# Patient Record
Sex: FEMALE | Race: BLACK OR AFRICAN AMERICAN | NOT HISPANIC OR LATINO | URBAN - METROPOLITAN AREA
[De-identification: names, ages, dates, MRNs, and addresses within clinical notes are randomized per-mention and may not be internally consistent; named-entity substitution may affect disease eponyms.]

---

## 2019-02-17 ENCOUNTER — EMERGENCY (EMERGENCY)
Facility: HOSPITAL | Age: 54
LOS: 0 days | Discharge: HOME | End: 2019-02-17
Attending: STUDENT IN AN ORGANIZED HEALTH CARE EDUCATION/TRAINING PROGRAM
Payer: MEDICAID

## 2019-02-17 VITALS
WEIGHT: 162.92 LBS | RESPIRATION RATE: 19 BRPM | OXYGEN SATURATION: 99 % | TEMPERATURE: 98 F | HEART RATE: 83 BPM | DIASTOLIC BLOOD PRESSURE: 90 MMHG | SYSTOLIC BLOOD PRESSURE: 149 MMHG | HEIGHT: 63 IN

## 2019-02-17 VITALS
SYSTOLIC BLOOD PRESSURE: 133 MMHG | TEMPERATURE: 98 F | DIASTOLIC BLOOD PRESSURE: 78 MMHG | RESPIRATION RATE: 18 BRPM | OXYGEN SATURATION: 100 % | HEART RATE: 78 BPM

## 2019-02-17 DIAGNOSIS — J45.909 UNSPECIFIED ASTHMA, UNCOMPLICATED: ICD-10-CM

## 2019-02-17 DIAGNOSIS — L29.9 PRURITUS, UNSPECIFIED: ICD-10-CM

## 2019-02-17 DIAGNOSIS — I10 ESSENTIAL (PRIMARY) HYPERTENSION: ICD-10-CM

## 2019-02-17 PROCEDURE — 99283 EMERGENCY DEPT VISIT LOW MDM: CPT

## 2019-02-17 RX ORDER — HYDROXYZINE HCL 10 MG
1 TABLET ORAL
Qty: 20 | Refills: 0
Start: 2019-02-17 | End: 2019-02-26

## 2019-02-17 RX ORDER — HYDROCORTISONE 1 %
1 OINTMENT (GRAM) TOPICAL
Qty: 1 | Refills: 0
Start: 2019-02-17 | End: 2019-02-23

## 2019-02-17 NOTE — ED ADULT NURSE NOTE - NSIMPLEMENTINTERV_GEN_ALL_ED
Implemented All Universal Safety Interventions:  [place] to call system. Call bell, personal items and telephone within reach. Instruct patient to call for assistance. Room bathroom lighting operational. Non-slip footwear when patient is off stretcher. Physically safe environment: no spills, clutter or unnecessary equipment. Stretcher in lowest position, wheels locked, appropriate side rails in place.

## 2019-02-17 NOTE — ED ADULT NURSE NOTE - OBJECTIVE STATEMENT
Pt A + O x 4, c/o bilat itching on palms and feet x 3 weeks with no relief after OTC meds and creams, states she can't get any sleep.  No rash noted.  Denies N/V/D or fever.

## 2019-02-17 NOTE — ED PROVIDER NOTE - OBJECTIVE STATEMENT
This is a 52 yo F c/o of F c/o of b/l palm and sole itching x 1 month. Denies nausea, vomiting, fever, sob, lip swelling rash and throat tightness. Pt only used Aquaphor to area w mild relief.

## 2019-02-17 NOTE — ED ADULT NURSE NOTE - CHPI ED NUR SYMPTOMS NEG
no pain/no decreased eating/drinking/no dizziness/no fever/no nausea/no weakness/no tingling/no vomiting/no chills

## 2020-02-11 ENCOUNTER — EMERGENCY (EMERGENCY)
Facility: HOSPITAL | Age: 55
LOS: 0 days | Discharge: HOME | End: 2020-02-11
Payer: MEDICAID

## 2020-02-11 VITALS — SYSTOLIC BLOOD PRESSURE: 158 MMHG | DIASTOLIC BLOOD PRESSURE: 98 MMHG | TEMPERATURE: 99 F

## 2020-02-11 VITALS — RESPIRATION RATE: 17 BRPM | HEIGHT: 62 IN | OXYGEN SATURATION: 95 % | WEIGHT: 149.91 LBS | HEART RATE: 78 BPM

## 2020-02-11 DIAGNOSIS — R05 COUGH: ICD-10-CM

## 2020-02-11 DIAGNOSIS — I10 ESSENTIAL (PRIMARY) HYPERTENSION: ICD-10-CM

## 2020-02-11 DIAGNOSIS — J98.01 ACUTE BRONCHOSPASM: ICD-10-CM

## 2020-02-11 PROCEDURE — 99284 EMERGENCY DEPT VISIT MOD MDM: CPT

## 2020-02-11 RX ORDER — IPRATROPIUM/ALBUTEROL SULFATE 18-103MCG
3 AEROSOL WITH ADAPTER (GRAM) INHALATION ONCE
Refills: 0 | Status: COMPLETED | OUTPATIENT
Start: 2020-02-11 | End: 2020-02-11

## 2020-02-11 RX ORDER — AZITHROMYCIN 500 MG/1
250 TABLET, FILM COATED ORAL ONCE
Refills: 0 | Status: COMPLETED | OUTPATIENT
Start: 2020-02-11 | End: 2020-02-11

## 2020-02-11 RX ORDER — AZITHROMYCIN 500 MG/1
1 TABLET, FILM COATED ORAL
Qty: 4 | Refills: 0
Start: 2020-02-11

## 2020-02-11 RX ADMIN — Medication 3 MILLILITER(S): at 22:30

## 2020-02-11 RX ADMIN — Medication 3 MILLILITER(S): at 22:05

## 2020-02-11 RX ADMIN — AZITHROMYCIN 250 MILLIGRAM(S): 500 TABLET, FILM COATED ORAL at 22:05

## 2020-02-11 RX ADMIN — Medication 50 MILLIGRAM(S): at 22:05

## 2020-02-11 NOTE — ED ADULT NURSE NOTE - OBJECTIVE STATEMENT
pt c/o cough with clear phlegm since last monday.  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

## 2020-02-11 NOTE — ED PROVIDER NOTE - OBJECTIVE STATEMENT
this is a 55 yo F c/o of cough x 2 weeks. Denies nausea, vomiting, chest pain, sob, recent travel, sick contact.

## 2020-02-11 NOTE — ED ADULT NURSE NOTE - NSIMPLEMENTINTERV_GEN_ALL_ED
Implemented All Universal Safety Interventions:  [place] to call system. Call bell, personal items and telephone within reach. Instruct patient to call for assistance. Room bathroom lighting operational. Non-slip footwear when patient is off stretcher. Physically safe environment: no spills, clutter or unnecessary equipment. Stretcher in lowest position, wheels locked, appropriate side rails in place.

## 2020-02-11 NOTE — ED PROVIDER NOTE - CLINICAL SUMMARY MEDICAL DECISION MAKING FREE TEXT BOX
nebs, zpack, prednsione and see pcp in 1 week   Discussed results and outcome of testing with the patient.  Patient advised to please follow up with their primary care doctor within the next 24-48 hours and return to the Emergency Department for worsening symptoms or any other concerns.  Patient advised that their doctor may call  to follow up on the specific results of the tests performed today in the emergency department.

## 2020-02-11 NOTE — ED PROVIDER NOTE - PATIENT PORTAL LINK FT
You can access the FollowMyHealth Patient Portal offered by [facility] by registering at the following website: [email or web address]. By joining [employer]’s FollowMyHealth portal, you will also be able to view your health information using other applications (apps) compatible with our system.

## 2020-02-12 PROBLEM — I10 ESSENTIAL (PRIMARY) HYPERTENSION: Chronic | Status: ACTIVE | Noted: 2019-02-17

## 2020-02-12 PROBLEM — J45.909 UNSPECIFIED ASTHMA, UNCOMPLICATED: Chronic | Status: ACTIVE | Noted: 2019-02-17

## 2020-02-13 ENCOUNTER — INPATIENT (INPATIENT)
Facility: HOSPITAL | Age: 55
LOS: 1 days | Discharge: HOME | End: 2020-02-15
Attending: INTERNAL MEDICINE | Admitting: INTERNAL MEDICINE
Payer: MEDICAID

## 2020-02-13 VITALS
TEMPERATURE: 99 F | OXYGEN SATURATION: 96 % | HEIGHT: 62 IN | WEIGHT: 160.06 LBS | RESPIRATION RATE: 20 BRPM | DIASTOLIC BLOOD PRESSURE: 112 MMHG | SYSTOLIC BLOOD PRESSURE: 169 MMHG | HEART RATE: 78 BPM

## 2020-02-13 LAB
ALBUMIN SERPL ELPH-MCNC: 2.8 G/DL — LOW (ref 3.3–5)
ALP SERPL-CCNC: 69 U/L — SIGNIFICANT CHANGE UP (ref 40–120)
ALT FLD-CCNC: 16 U/L — SIGNIFICANT CHANGE UP (ref 12–78)
ANION GAP SERPL CALC-SCNC: 5 MMOL/L — SIGNIFICANT CHANGE UP (ref 5–17)
ANISOCYTOSIS BLD QL: SLIGHT — SIGNIFICANT CHANGE UP
APTT BLD: 26.7 SEC — LOW (ref 27.5–36.3)
AST SERPL-CCNC: 13 U/L — LOW (ref 15–37)
BASOPHILS # BLD AUTO: 0 K/UL — SIGNIFICANT CHANGE UP (ref 0–0.2)
BASOPHILS NFR BLD AUTO: 0 % — SIGNIFICANT CHANGE UP (ref 0–2)
BILIRUB SERPL-MCNC: 0.3 MG/DL — SIGNIFICANT CHANGE UP (ref 0.2–1.2)
BUN SERPL-MCNC: 27 MG/DL — HIGH (ref 7–23)
CALCIUM SERPL-MCNC: 8.2 MG/DL — LOW (ref 8.5–10.1)
CHLORIDE SERPL-SCNC: 108 MMOL/L — SIGNIFICANT CHANGE UP (ref 96–108)
CO2 SERPL-SCNC: 26 MMOL/L — SIGNIFICANT CHANGE UP (ref 22–31)
CREAT SERPL-MCNC: 2.01 MG/DL — HIGH (ref 0.5–1.3)
D DIMER BLD IA.RAPID-MCNC: 169 NG/ML DDU — SIGNIFICANT CHANGE UP
EOSINOPHIL # BLD AUTO: 0 K/UL — SIGNIFICANT CHANGE UP (ref 0–0.5)
EOSINOPHIL NFR BLD AUTO: 0 % — SIGNIFICANT CHANGE UP (ref 0–6)
FLU A RESULT: SIGNIFICANT CHANGE UP
FLU A RESULT: SIGNIFICANT CHANGE UP
FLUAV AG NPH QL: SIGNIFICANT CHANGE UP
FLUBV AG NPH QL: SIGNIFICANT CHANGE UP
GLUCOSE SERPL-MCNC: 94 MG/DL — SIGNIFICANT CHANGE UP (ref 70–99)
HCT VFR BLD CALC: 38.5 % — SIGNIFICANT CHANGE UP (ref 34.5–45)
HGB BLD-MCNC: 12.5 G/DL — SIGNIFICANT CHANGE UP (ref 11.5–15.5)
INR BLD: 0.91 RATIO — SIGNIFICANT CHANGE UP (ref 0.88–1.16)
LYMPHOCYTES # BLD AUTO: 26 % — SIGNIFICANT CHANGE UP (ref 13–44)
LYMPHOCYTES # BLD AUTO: 4.84 K/UL — HIGH (ref 1–3.3)
MANUAL SMEAR VERIFICATION: SIGNIFICANT CHANGE UP
MCHC RBC-ENTMCNC: 29.6 PG — SIGNIFICANT CHANGE UP (ref 27–34)
MCHC RBC-ENTMCNC: 32.5 GM/DL — SIGNIFICANT CHANGE UP (ref 32–36)
MCV RBC AUTO: 91 FL — SIGNIFICANT CHANGE UP (ref 80–100)
MONOCYTES # BLD AUTO: 0.56 K/UL — SIGNIFICANT CHANGE UP (ref 0–0.9)
MONOCYTES NFR BLD AUTO: 3 % — SIGNIFICANT CHANGE UP (ref 2–14)
NEUTROPHILS # BLD AUTO: 13.02 K/UL — HIGH (ref 1.8–7.4)
NEUTROPHILS NFR BLD AUTO: 69 % — SIGNIFICANT CHANGE UP (ref 43–77)
NEUTS BAND # BLD: 1 % — SIGNIFICANT CHANGE UP (ref 0–8)
NRBC # BLD: 0 /100 — SIGNIFICANT CHANGE UP (ref 0–0)
NRBC # BLD: SIGNIFICANT CHANGE UP /100 WBCS (ref 0–0)
NT-PROBNP SERPL-SCNC: 614 PG/ML — HIGH (ref 0–125)
PLAT MORPH BLD: NORMAL — SIGNIFICANT CHANGE UP
PLATELET # BLD AUTO: 367 K/UL — SIGNIFICANT CHANGE UP (ref 150–400)
POTASSIUM SERPL-MCNC: 4.2 MMOL/L — SIGNIFICANT CHANGE UP (ref 3.5–5.3)
POTASSIUM SERPL-SCNC: 4.2 MMOL/L — SIGNIFICANT CHANGE UP (ref 3.5–5.3)
PROT SERPL-MCNC: 7.1 GM/DL — SIGNIFICANT CHANGE UP (ref 6–8.3)
PROTHROM AB SERPL-ACNC: 10.2 SEC — SIGNIFICANT CHANGE UP (ref 10–12.9)
RBC # BLD: 4.23 M/UL — SIGNIFICANT CHANGE UP (ref 3.8–5.2)
RBC # FLD: 12.8 % — SIGNIFICANT CHANGE UP (ref 10.3–14.5)
RBC BLD AUTO: SIGNIFICANT CHANGE UP
RSV RESULT: SIGNIFICANT CHANGE UP
RSV RNA RESP QL NAA+PROBE: SIGNIFICANT CHANGE UP
SODIUM SERPL-SCNC: 139 MMOL/L — SIGNIFICANT CHANGE UP (ref 135–145)
VARIANT LYMPHS # BLD: 1 % — SIGNIFICANT CHANGE UP (ref 0–6)
WBC # BLD: 18.6 K/UL — HIGH (ref 3.8–10.5)
WBC # FLD AUTO: 18.6 K/UL — HIGH (ref 3.8–10.5)

## 2020-02-13 PROCEDURE — 93010 ELECTROCARDIOGRAM REPORT: CPT

## 2020-02-13 PROCEDURE — 99285 EMERGENCY DEPT VISIT HI MDM: CPT

## 2020-02-13 PROCEDURE — 71045 X-RAY EXAM CHEST 1 VIEW: CPT | Mod: 26

## 2020-02-13 RX ORDER — SODIUM CHLORIDE 9 MG/ML
1000 INJECTION INTRAMUSCULAR; INTRAVENOUS; SUBCUTANEOUS ONCE
Refills: 0 | Status: DISCONTINUED | OUTPATIENT
Start: 2020-02-13 | End: 2020-02-13

## 2020-02-13 RX ORDER — SODIUM CHLORIDE 9 MG/ML
1000 INJECTION INTRAMUSCULAR; INTRAVENOUS; SUBCUTANEOUS ONCE
Refills: 0 | Status: COMPLETED | OUTPATIENT
Start: 2020-02-13 | End: 2020-02-13

## 2020-02-13 RX ORDER — GUAIFENESIN/DM/PSEUDOEPHEDRINE 595-32-48
10 TABLET, EXTENDED RELEASE 12 HR ORAL
Qty: 0 | Refills: 0 | DISCHARGE

## 2020-02-13 RX ADMIN — SODIUM CHLORIDE 1000 MILLILITER(S): 9 INJECTION INTRAMUSCULAR; INTRAVENOUS; SUBCUTANEOUS at 23:31

## 2020-02-13 RX ADMIN — SODIUM CHLORIDE 1000 MILLILITER(S): 9 INJECTION INTRAMUSCULAR; INTRAVENOUS; SUBCUTANEOUS at 22:44

## 2020-02-13 NOTE — ED PROVIDER NOTE - NS ED ROS FT
No fever/chills, No photophobia/eye pain/changes in vision, No ear pain/sore throat/dysphagia, No chest pain/palpitations, + SOB, + cough, no wheeze/stridor, No abdominal pain, No N/V/D, no dysuria/frequency/discharge, No neck/back pain, no rash, no changes in neurological status/function.

## 2020-02-13 NOTE — ED ADULT NURSE NOTE - CHPI ED NUR SYMPTOMS NEG
no fever/no hemoptysis/no headache/no body aches/no chills/no chest pain/no diaphoresis/no shortness of breath/no wheezing/no edema

## 2020-02-13 NOTE — ED PROVIDER NOTE - OBJECTIVE STATEMENT
Pertinent PMH/PSH/FHx/SHx and Review of Systems contained within:     55 y/o F with pmhx HTN (no medications), and childhood asthma presents with non-productive cough x2 weeks. Pt states she feels chest discomfort with cough and SOB. Pt was in fast track here two days ago and was given azithromycin, prednisone, and cough supressor medications. Patient denies EtOH/tobacco/illicit substance use. Pt also denies no recent travel or sick contacts. Pertinent PMH/PSH/FHx/SHx and Review of Systems contained within:     55 y/o F with pmhx HTN (no medications), and childhood asthma presents with non-productive cough x2 weeks. Pt states she feels chest discomfort with cough and SOB. Pt was in fast track here two days ago and was given azithromycin, prednisone, and tessalon perle medications. Patient denies EtOH/tobacco/illicit substance use. Pt also denies no recent travel or sick contacts.  Denies calf pain, leg edema, hemoptysis, or use of exogenous estrogen, however has been feeling dyspneic.

## 2020-02-13 NOTE — ED ADULT NURSE NOTE - ED STAT RN HANDOFF DETAILS
report given zeyad Alexandra RN.  Pt a&ox4, presents to the ED with chief complaint of cough. Pt admitted for bronchitis and acute kidney injury. BP elevated in the ED, BP meds to be given.  Pt is aware of admit, in good spirits, and denies pain/discomfort.

## 2020-02-13 NOTE — ED PROVIDER NOTE - CLINICAL SUMMARY MEDICAL DECISION MAKING FREE TEXT BOX
Patient with cough, dyspnea. VSS.  Labs reviewed, Dimer negative however has elevated creatinine, denies history of renal failure.  Ct noncon consistent with mucus plugging and bronchitis.  Patient is to be admitted to the hospital and the case was discussed with the admitting physician.  Any changes in plan, additional imaging/labs, and further work up will be at the discretion of the admitting physician.

## 2020-02-13 NOTE — ED ADULT TRIAGE NOTE - CHIEF COMPLAINT QUOTE
coughing for 3 weeks with no improvement with medications given. Pt states she having difficulty breathing. Pt states non compliant with bp meds

## 2020-02-13 NOTE — ED PROVIDER NOTE - PHYSICAL EXAMINATION
Gen: Alert, + dry hacking cough, well appearing  Head: NC, AT, PERRL, EOMI, normal lids/conjunctiva  ENT: normal hearing, patent oropharynx without erythema/exudate, uvula midline  Neck: +supple, no tenderness/meningismus/JVD, +Trachea midline  Pulm: Bilateral BS, normal resp effort, no wheeze/stridor/retractions  CV: RRR, no M/R/G, +dist pulses  Abd: soft, NT/ND, Negative [place] signs, +BS, no palpable masses  Mskel: no edema/erythema/cyanosis  Skin: no rash, warm/dry  Neuro: AAOx3, no apparent sensory/motor deficits, coordination intact

## 2020-02-14 DIAGNOSIS — I10 ESSENTIAL (PRIMARY) HYPERTENSION: ICD-10-CM

## 2020-02-14 DIAGNOSIS — J40 BRONCHITIS, NOT SPECIFIED AS ACUTE OR CHRONIC: ICD-10-CM

## 2020-02-14 DIAGNOSIS — N17.9 ACUTE KIDNEY FAILURE, UNSPECIFIED: ICD-10-CM

## 2020-02-14 PROCEDURE — 71250 CT THORAX DX C-: CPT | Mod: 26

## 2020-02-14 PROCEDURE — 99222 1ST HOSP IP/OBS MODERATE 55: CPT

## 2020-02-14 PROCEDURE — 12345: CPT | Mod: NC

## 2020-02-14 RX ORDER — GUAIFENESIN/DEXTROMETHORPHAN 600MG-30MG
10 TABLET, EXTENDED RELEASE 12 HR ORAL EVERY 8 HOURS
Refills: 0 | Status: DISCONTINUED | OUTPATIENT
Start: 2020-02-14 | End: 2020-02-15

## 2020-02-14 RX ORDER — IBUPROFEN 200 MG
400 TABLET ORAL EVERY 4 HOURS
Refills: 0 | Status: DISCONTINUED | OUTPATIENT
Start: 2020-02-14 | End: 2020-02-14

## 2020-02-14 RX ORDER — ACETAMINOPHEN 500 MG
650 TABLET ORAL ONCE
Refills: 0 | Status: COMPLETED | OUTPATIENT
Start: 2020-02-14 | End: 2020-02-14

## 2020-02-14 RX ORDER — SODIUM CHLORIDE 9 MG/ML
1000 INJECTION INTRAMUSCULAR; INTRAVENOUS; SUBCUTANEOUS ONCE
Refills: 0 | Status: COMPLETED | OUTPATIENT
Start: 2020-02-14 | End: 2020-02-14

## 2020-02-14 RX ORDER — OXYCODONE AND ACETAMINOPHEN 5; 325 MG/1; MG/1
1 TABLET ORAL ONCE
Refills: 0 | Status: DISCONTINUED | OUTPATIENT
Start: 2020-02-14 | End: 2020-02-14

## 2020-02-14 RX ORDER — NIFEDIPINE 30 MG
60 TABLET, EXTENDED RELEASE 24 HR ORAL DAILY
Refills: 0 | Status: DISCONTINUED | OUTPATIENT
Start: 2020-02-14 | End: 2020-02-15

## 2020-02-14 RX ORDER — INFLUENZA VIRUS VACCINE 15; 15; 15; 15 UG/.5ML; UG/.5ML; UG/.5ML; UG/.5ML
0.5 SUSPENSION INTRAMUSCULAR ONCE
Refills: 0 | Status: DISCONTINUED | OUTPATIENT
Start: 2020-02-14 | End: 2020-02-15

## 2020-02-14 RX ADMIN — Medication 10 MILLILITER(S): at 21:54

## 2020-02-14 RX ADMIN — Medication 650 MILLIGRAM(S): at 13:54

## 2020-02-14 RX ADMIN — Medication 60 MILLIGRAM(S): at 03:30

## 2020-02-14 RX ADMIN — Medication 10 MILLILITER(S): at 12:17

## 2020-02-14 RX ADMIN — Medication 100 MILLIGRAM(S): at 01:39

## 2020-02-14 RX ADMIN — OXYCODONE AND ACETAMINOPHEN 1 TABLET(S): 5; 325 TABLET ORAL at 23:44

## 2020-02-14 RX ADMIN — Medication 650 MILLIGRAM(S): at 14:54

## 2020-02-14 RX ADMIN — SODIUM CHLORIDE 1000 MILLILITER(S): 9 INJECTION INTRAMUSCULAR; INTRAVENOUS; SUBCUTANEOUS at 04:18

## 2020-02-14 NOTE — H&P ADULT - HISTORY OF PRESENT ILLNESS
Patient is a 55 YO F with a PMH of HTN (no meds) and childhood asthma who presents to ED for nonproductive cough x 2 weeks.  Patient reports cough with chest discomfort and dyspnea with prolonged exertion.  Presented to fast track two days ago and was given prednisone, azithromycin but presented back to ED as she had continued symptoms.  Patient has no other complaints.  Denies hx of fever, chills, nausea, vomiting, diarrhea.  Patient denies any history of kidney disease, states she had blood work done at [facility] last year.

## 2020-02-14 NOTE — H&P ADULT - PROBLEM SELECTOR PLAN 1
Given 2L bolus, will give 1 more L and rpt bmp  If still elevated, consider US renal and renal consult

## 2020-02-14 NOTE — H&P ADULT - ASSESSMENT
54F presents for cough.  CXR negative.  BP elevated in ED  Labs show elevation of Cr.  Will admit patient to floor for JORGE LUIS

## 2020-02-14 NOTE — H&P ADULT - NSHPLABSRESULTS_GEN_ALL_CORE
Recent Vitals  T(C): 37.1 (02-13-20 @ 23:55), Max: 37.1 (02-13-20 @ 18:05)  HR: 70 (02-13-20 @ 23:55) (70 - 78)  BP: 140/55 (02-14-20 @ 01:09) (140/55 - 189/104)  RR: 18 (02-13-20 @ 23:55) (18 - 20)  SpO2: 97% (02-13-20 @ 23:55) (96% - 97%)                        12.5   18.60 )-----------( 367      ( 13 Feb 2020 22:18 )             38.5     02-13    139  |  108  |  27<H>  ----------------------------<  94  4.2   |  26  |  2.01<H>    Ca    8.2<L>      13 Feb 2020 22:45    TPro  7.1  /  Alb  2.8<L>  /  TBili  0.3  /  DBili  x   /  AST  13<L>  /  ALT  16  /  AlkPhos  69  02-13    PT/INR - ( 13 Feb 2020 22:18 )   PT: 10.2 sec;   INR: 0.91 ratio         PTT - ( 13 Feb 2020 22:18 )  PTT:26.7 sec  LIVER FUNCTIONS - ( 13 Feb 2020 22:45 )  Alb: 2.8 g/dL / Pro: 7.1 gm/dL / ALK PHOS: 69 U/L / ALT: 16 U/L / AST: 13 U/L / GGT: x               Home Medications:  Robitussin D oral liquid: 10 milliliter(s) orally every 4 hours, As Needed - for cough (13 Feb 2020 23:00)

## 2020-02-14 NOTE — PROGRESS NOTE ADULT - SUBJECTIVE AND OBJECTIVE BOX
Patient is a 54y old  Female who presents with a chief complaint of cough (14 Feb 2020 02:18)      INTERVAL HPI/OVERNIGHT EVENTS:  Pt was seen and examined, no acute events.    MEDICATIONS  (STANDING):  influenza   Vaccine 0.5 milliLiter(s) IntraMuscular once  NIFEdipine XL 60 milliGRAM(s) Oral daily    MEDICATIONS  (PRN):  guaifenesin/dextromethorphan  Syrup 10 milliLiter(s) Oral every 8 hours PRN Cough      Allergies  No Known Allergies      Vital Signs Last 24 Hrs  T(C): 37.1 (14 Feb 2020 17:13), Max: 37.1 (13 Feb 2020 18:05)  T(F): 98.7 (14 Feb 2020 17:13), Max: 98.8 (13 Feb 2020 18:05)  HR: 79 (14 Feb 2020 17:13) (64 - 82)  BP: 116/72 (14 Feb 2020 17:13) (116/72 - 189/104)  BP(mean): --  RR: 18 (14 Feb 2020 17:13) (16 - 20)  SpO2: 100% (14 Feb 2020 17:13) (96% - 100%)      PHYSICAL EXAM:  GENERAL: NAD  HEAD:  Atraumatic  EYES: PERRLA   NERVOUS SYSTEM:  Awake, alert  CHEST/LUNG: Clear  HEART: RRR  ABDOMEN: Soft, non tender  EXTREMITIES:  no edema      LABS:                        12.5   18.60 )-----------( 367      ( 13 Feb 2020 22:18 )             38.5     02-13    139  |  108  |  27<H>  ----------------------------<  94  4.2   |  26  |  2.01<H>    Ca    8.2<L>      13 Feb 2020 22:45    TPro  7.1  /  Alb  2.8<L>  /  TBili  0.3  /  DBili  x   /  AST  13<L>  /  ALT  16  /  AlkPhos  69  02-13    PT/INR - ( 13 Feb 2020 22:18 )   PT: 10.2 sec;   INR: 0.91 ratio         PTT - ( 13 Feb 2020 22:18 )  PTT:26.7 sec    CAPILLARY BLOOD GLUCOSE          RADIOLOGY & ADDITIONAL TESTS:    Imaging Personally Reviewed:  [ ] YES  [ ] NO    Consultant(s) Notes Reviewed:  [ ] YES  [ ] NO    Care Discussed with Consultants/Other Providers [ ] YES  [ ] NO

## 2020-02-15 VITALS
OXYGEN SATURATION: 100 % | HEART RATE: 81 BPM | DIASTOLIC BLOOD PRESSURE: 84 MMHG | TEMPERATURE: 97 F | RESPIRATION RATE: 16 BRPM | SYSTOLIC BLOOD PRESSURE: 129 MMHG

## 2020-02-15 LAB
ALBUMIN SERPL ELPH-MCNC: 2.8 G/DL — LOW (ref 3.3–5)
ALP SERPL-CCNC: 73 U/L — SIGNIFICANT CHANGE UP (ref 40–120)
ALT FLD-CCNC: 15 U/L — SIGNIFICANT CHANGE UP (ref 12–78)
ANION GAP SERPL CALC-SCNC: 6 MMOL/L — SIGNIFICANT CHANGE UP (ref 5–17)
AST SERPL-CCNC: 13 U/L — LOW (ref 15–37)
BILIRUB SERPL-MCNC: 0.4 MG/DL — SIGNIFICANT CHANGE UP (ref 0.2–1.2)
BUN SERPL-MCNC: 23 MG/DL — SIGNIFICANT CHANGE UP (ref 7–23)
CALCIUM SERPL-MCNC: 8.8 MG/DL — SIGNIFICANT CHANGE UP (ref 8.5–10.1)
CHLORIDE SERPL-SCNC: 106 MMOL/L — SIGNIFICANT CHANGE UP (ref 96–108)
CO2 SERPL-SCNC: 26 MMOL/L — SIGNIFICANT CHANGE UP (ref 22–31)
CREAT SERPL-MCNC: 1.6 MG/DL — HIGH (ref 0.5–1.3)
GLUCOSE SERPL-MCNC: 92 MG/DL — SIGNIFICANT CHANGE UP (ref 70–99)
HBA1C BLD-MCNC: 5.3 % — SIGNIFICANT CHANGE UP (ref 4–5.6)
HCT VFR BLD CALC: 43.4 % — SIGNIFICANT CHANGE UP (ref 34.5–45)
HGB BLD-MCNC: 13.7 G/DL — SIGNIFICANT CHANGE UP (ref 11.5–15.5)
MCHC RBC-ENTMCNC: 28.6 PG — SIGNIFICANT CHANGE UP (ref 27–34)
MCHC RBC-ENTMCNC: 31.6 GM/DL — LOW (ref 32–36)
MCV RBC AUTO: 90.6 FL — SIGNIFICANT CHANGE UP (ref 80–100)
NRBC # BLD: 0 /100 WBCS — SIGNIFICANT CHANGE UP (ref 0–0)
PLATELET # BLD AUTO: 398 K/UL — SIGNIFICANT CHANGE UP (ref 150–400)
POTASSIUM SERPL-MCNC: 3.7 MMOL/L — SIGNIFICANT CHANGE UP (ref 3.5–5.3)
POTASSIUM SERPL-SCNC: 3.7 MMOL/L — SIGNIFICANT CHANGE UP (ref 3.5–5.3)
PROT SERPL-MCNC: 7 GM/DL — SIGNIFICANT CHANGE UP (ref 6–8.3)
RBC # BLD: 4.79 M/UL — SIGNIFICANT CHANGE UP (ref 3.8–5.2)
RBC # FLD: 12.8 % — SIGNIFICANT CHANGE UP (ref 10.3–14.5)
SODIUM SERPL-SCNC: 138 MMOL/L — SIGNIFICANT CHANGE UP (ref 135–145)
WBC # BLD: 14.8 K/UL — HIGH (ref 3.8–10.5)
WBC # FLD AUTO: 14.8 K/UL — HIGH (ref 3.8–10.5)

## 2020-02-15 PROCEDURE — 99238 HOSP IP/OBS DSCHRG MGMT 30/<: CPT

## 2020-02-15 RX ORDER — NIFEDIPINE 30 MG
1 TABLET, EXTENDED RELEASE 24 HR ORAL
Qty: 30 | Refills: 0
Start: 2020-02-15 | End: 2020-03-15

## 2020-02-15 RX ADMIN — Medication 10 MILLILITER(S): at 06:03

## 2020-02-15 RX ADMIN — OXYCODONE AND ACETAMINOPHEN 1 TABLET(S): 5; 325 TABLET ORAL at 00:44

## 2020-02-15 NOTE — DISCHARGE NOTE PROVIDER - NSDCCPCAREPLAN_GEN_ALL_CORE_FT
PRINCIPAL DISCHARGE DIAGNOSIS  Diagnosis: Bronchitis  Assessment and Plan of Treatment:       SECONDARY DISCHARGE DIAGNOSES  Diagnosis: Acute renal injury  Assessment and Plan of Treatment:

## 2020-02-15 NOTE — DISCHARGE NOTE NURSING/CASE MANAGEMENT/SOCIAL WORK - PATIENT PORTAL LINK FT
You can access the FollowMyHealth Patient Portal offered by [facility] by registering at the following website: [email or web address]. By joining [employer]’s FollowMyHealth portal, you will also be able to view your health information using other applications (apps) compatible with our system.

## 2020-02-15 NOTE — DISCHARGE NOTE PROVIDER - HOSPITAL COURSE
55 YO F with a PMH of HTN (no meds) and childhood asthma who presents to ED for nonproductive cough x 2 weeks.  Patient reports cough with chest discomfort and dyspnea with prolonged exertion.  Presented to fast track two days ago and was given prednisone, azithromycin but presented back to ED as she had continued symptoms.  Patient has no other complaints.    She was admitted for JORGE LUIS, bronchitis; renal function has improved; she is feeling better and will be discharged home today.

## 2020-02-15 NOTE — DISCHARGE NOTE PROVIDER - NSDCMRMEDTOKEN_GEN_ALL_CORE_FT
Ala-Will 1% topical cream: Apply topically to affected area 2 times a day to affected area    benzonatate 200 mg oral capsule: 1 cap(s) orally 3 times a day prn cough  doxycycline monohydrate 100 mg oral capsule: 1 cap(s) orally every 12 hours   NIFEdipine 60 mg oral tablet, extended release: 1 tab(s) orally once a day  predniSONE 20 mg oral tablet: 1 tab(s) orally once a day   Robitussin D oral liquid: 10 milliliter(s) orally every 4 hours, As Needed - for cough

## 2020-02-20 DIAGNOSIS — J40 BRONCHITIS, NOT SPECIFIED AS ACUTE OR CHRONIC: ICD-10-CM

## 2020-02-20 DIAGNOSIS — I10 ESSENTIAL (PRIMARY) HYPERTENSION: ICD-10-CM

## 2020-02-20 DIAGNOSIS — R05 COUGH: ICD-10-CM

## 2020-02-20 DIAGNOSIS — Z79.52 LONG TERM (CURRENT) USE OF SYSTEMIC STEROIDS: ICD-10-CM

## 2020-02-20 DIAGNOSIS — J45.909 UNSPECIFIED ASTHMA, UNCOMPLICATED: ICD-10-CM

## 2020-02-20 DIAGNOSIS — N17.9 ACUTE KIDNEY FAILURE, UNSPECIFIED: ICD-10-CM

## 2020-04-25 NOTE — DISCHARGE NOTE NURSING/CASE MANAGEMENT/SOCIAL WORK - COMPLETE THE FOLLOWING IF THE PATIENT REFUSES THE INFLUENZA VACCINE:
shortness of breath
Vaccine Information Sheet (VIS) provided-VIS date: 8/15/19/Risks/benefits discussed with patient/surrogate

## 2020-10-27 NOTE — DISCHARGE NOTE PROVIDER - NSDCQMSTROKE_NEU_ALL_CORE

## 2022-04-04 NOTE — ED ADULT NURSE NOTE - SCORE
2 V-Y Plasty Text: The defect edges were debeveled with a #15 scalpel blade.  Given the location of the defect, shape of the defect and the proximity to free margins an V-Y advancement flap was deemed most appropriate.  Using a sterile surgical marker, an appropriate advancement flap was drawn incorporating the defect and placing the expected incisions within the relaxed skin tension lines where possible.    The area thus outlined was incised deep to adipose tissue with a #15 scalpel blade.  The skin margins were undermined to an appropriate distance in all directions utilizing iris scissors.

## 2023-04-20 NOTE — ED PROVIDER NOTE - PSH
[General Appearance - Alert] : alert [General Appearance - In No Acute Distress] : in no acute distress [Sclera] : the sclera and conjunctiva were normal [PERRL With Normal Accommodation] : pupils were equal in size, round, and reactive to light [Extraocular Movements] : extraocular movements were intact [Outer Ear] : the ears and nose were normal in appearance [Oropharynx] : the oropharynx was normal [Neck Appearance] : the appearance of the neck was normal [Neck Cervical Mass (___cm)] : no neck mass was observed [Jugular Venous Distention Increased] : there was no jugular-venous distention [Thyroid Diffuse Enlargement] : the thyroid was not enlarged [Thyroid Nodule] : there were no palpable thyroid nodules [Auscultation Breath Sounds / Voice Sounds] : lungs were clear to auscultation bilaterally [Heart Sounds] : normal S1 and S2 [Heart Rate And Rhythm] : heart rate was normal and rhythm regular [Heart Sounds Gallop] : no gallops [Murmurs] : no murmurs [Heart Sounds Pericardial Friction Rub] : no pericardial rub [Bowel Sounds] : normal bowel sounds [Abdomen Soft] : soft [Abdomen Tenderness] : non-tender No significant past surgical history [Abdomen Mass (___ Cm)] : no abdominal mass palpated [Abnormal Walk] : normal gait [Nail Clubbing] : no clubbing  or cyanosis of the fingernails [Musculoskeletal - Swelling] : no joint swelling seen [Motor Tone] : muscle strength and tone were normal [FreeTextEntry1] : Tenderness with range of motion of bilateral shoulders L > R. Tenderness is lateral superior to triceps. Right hand 2nd MCP and thumb are swollen, non tender [] : no rash [Affect] : the affect was normal [Mood] : the mood was normal

## 2024-01-20 ENCOUNTER — EMERGENCY (EMERGENCY)
Facility: HOSPITAL | Age: 59
LOS: 0 days | Discharge: HOME | End: 2024-01-20
Attending: EMERGENCY MEDICINE
Payer: MEDICAID

## 2024-01-20 VITALS
DIASTOLIC BLOOD PRESSURE: 89 MMHG | HEART RATE: 82 BPM | SYSTOLIC BLOOD PRESSURE: 145 MMHG | OXYGEN SATURATION: 100 % | RESPIRATION RATE: 18 BRPM

## 2024-01-20 VITALS
SYSTOLIC BLOOD PRESSURE: 177 MMHG | DIASTOLIC BLOOD PRESSURE: 98 MMHG | OXYGEN SATURATION: 97 % | TEMPERATURE: 98 F | HEART RATE: 109 BPM | WEIGHT: 197.98 LBS | HEIGHT: 62 IN | RESPIRATION RATE: 19 BRPM

## 2024-01-20 DIAGNOSIS — M32.9 SYSTEMIC LUPUS ERYTHEMATOSUS, UNSPECIFIED: ICD-10-CM

## 2024-01-20 DIAGNOSIS — Z88.8 ALLERGY STATUS TO OTHER DRUGS, MEDICAMENTS AND BIOLOGICAL SUBSTANCES: ICD-10-CM

## 2024-01-20 DIAGNOSIS — R79.89 OTHER SPECIFIED ABNORMAL FINDINGS OF BLOOD CHEMISTRY: ICD-10-CM

## 2024-01-20 DIAGNOSIS — N18.30 CHRONIC KIDNEY DISEASE, STAGE 3 UNSPECIFIED: ICD-10-CM

## 2024-01-20 LAB
ALBUMIN SERPL ELPH-MCNC: 3.2 G/DL — LOW (ref 3.3–5)
ALP SERPL-CCNC: 63 U/L — SIGNIFICANT CHANGE UP (ref 40–120)
ALT FLD-CCNC: 23 U/L — SIGNIFICANT CHANGE UP (ref 12–78)
ANION GAP SERPL CALC-SCNC: 7 MMOL/L — SIGNIFICANT CHANGE UP (ref 5–17)
APPEARANCE UR: CLEAR — SIGNIFICANT CHANGE UP
AST SERPL-CCNC: 23 U/L — SIGNIFICANT CHANGE UP (ref 15–37)
BACTERIA # UR AUTO: ABNORMAL /HPF
BASOPHILS # BLD AUTO: 0.03 K/UL — SIGNIFICANT CHANGE UP (ref 0–0.2)
BASOPHILS NFR BLD AUTO: 0.3 % — SIGNIFICANT CHANGE UP (ref 0–2)
BILIRUB SERPL-MCNC: 0.6 MG/DL — SIGNIFICANT CHANGE UP (ref 0.2–1.2)
BILIRUB UR-MCNC: NEGATIVE — SIGNIFICANT CHANGE UP
BUN SERPL-MCNC: 40 MG/DL — HIGH (ref 7–23)
CALCIUM SERPL-MCNC: 9.5 MG/DL — SIGNIFICANT CHANGE UP (ref 8.5–10.1)
CHLORIDE SERPL-SCNC: 106 MMOL/L — SIGNIFICANT CHANGE UP (ref 96–108)
CO2 SERPL-SCNC: 27 MMOL/L — SIGNIFICANT CHANGE UP (ref 22–31)
COLOR SPEC: YELLOW — SIGNIFICANT CHANGE UP
CREAT SERPL-MCNC: 2.56 MG/DL — HIGH (ref 0.5–1.3)
DIFF PNL FLD: NEGATIVE — SIGNIFICANT CHANGE UP
EGFR: 21 ML/MIN/1.73M2 — LOW
EOSINOPHIL # BLD AUTO: 0 K/UL — SIGNIFICANT CHANGE UP (ref 0–0.5)
EOSINOPHIL NFR BLD AUTO: 0 % — SIGNIFICANT CHANGE UP (ref 0–6)
EPI CELLS # UR: PRESENT
GLUCOSE SERPL-MCNC: 82 MG/DL — SIGNIFICANT CHANGE UP (ref 70–99)
GLUCOSE UR QL: NEGATIVE MG/DL — SIGNIFICANT CHANGE UP
HCT VFR BLD CALC: 45.7 % — HIGH (ref 34.5–45)
HGB BLD-MCNC: 14.4 G/DL — SIGNIFICANT CHANGE UP (ref 11.5–15.5)
IMM GRANULOCYTES NFR BLD AUTO: 1 % — HIGH (ref 0–0.9)
KETONES UR-MCNC: NEGATIVE MG/DL — SIGNIFICANT CHANGE UP
LEUKOCYTE ESTERASE UR-ACNC: ABNORMAL
LYMPHOCYTES # BLD AUTO: 1.16 K/UL — SIGNIFICANT CHANGE UP (ref 1–3.3)
LYMPHOCYTES # BLD AUTO: 10.3 % — LOW (ref 13–44)
MAGNESIUM SERPL-MCNC: 2.4 MG/DL — SIGNIFICANT CHANGE UP (ref 1.6–2.6)
MCHC RBC-ENTMCNC: 29.8 PG — SIGNIFICANT CHANGE UP (ref 27–34)
MCHC RBC-ENTMCNC: 31.5 G/DL — LOW (ref 32–36)
MCV RBC AUTO: 94.6 FL — SIGNIFICANT CHANGE UP (ref 80–100)
MONOCYTES # BLD AUTO: 0.59 K/UL — SIGNIFICANT CHANGE UP (ref 0–0.9)
MONOCYTES NFR BLD AUTO: 5.2 % — SIGNIFICANT CHANGE UP (ref 2–14)
NEUTROPHILS # BLD AUTO: 9.38 K/UL — HIGH (ref 1.8–7.4)
NEUTROPHILS NFR BLD AUTO: 83.2 % — HIGH (ref 43–77)
NITRITE UR-MCNC: NEGATIVE — SIGNIFICANT CHANGE UP
NRBC # BLD: 0 /100 WBCS — SIGNIFICANT CHANGE UP (ref 0–0)
PH UR: 7 — SIGNIFICANT CHANGE UP (ref 5–8)
PLATELET # BLD AUTO: 299 K/UL — SIGNIFICANT CHANGE UP (ref 150–400)
POTASSIUM SERPL-MCNC: 4.9 MMOL/L — SIGNIFICANT CHANGE UP (ref 3.5–5.3)
POTASSIUM SERPL-SCNC: 4.9 MMOL/L — SIGNIFICANT CHANGE UP (ref 3.5–5.3)
PROT SERPL-MCNC: 7.4 GM/DL — SIGNIFICANT CHANGE UP (ref 6–8.3)
PROT UR-MCNC: 300 MG/DL
RBC # BLD: 4.83 M/UL — SIGNIFICANT CHANGE UP (ref 3.8–5.2)
RBC # FLD: 14.9 % — HIGH (ref 10.3–14.5)
RBC CASTS # UR COMP ASSIST: SIGNIFICANT CHANGE UP /HPF (ref 0–4)
SODIUM SERPL-SCNC: 140 MMOL/L — SIGNIFICANT CHANGE UP (ref 135–145)
SP GR SPEC: 1.02 — SIGNIFICANT CHANGE UP (ref 1–1.03)
UROBILINOGEN FLD QL: 0.2 MG/DL — SIGNIFICANT CHANGE UP (ref 0.2–1)
WBC # BLD: 11.27 K/UL — HIGH (ref 3.8–10.5)
WBC # FLD AUTO: 11.27 K/UL — HIGH (ref 3.8–10.5)
WBC UR QL: 5 /HPF — SIGNIFICANT CHANGE UP (ref 0–5)

## 2024-01-20 PROCEDURE — 93010 ELECTROCARDIOGRAM REPORT: CPT

## 2024-01-20 PROCEDURE — 99284 EMERGENCY DEPT VISIT MOD MDM: CPT

## 2024-01-20 NOTE — ED ADULT NURSE NOTE - NSFALLUNIVINTERV_ED_ALL_ED
Bed/Stretcher in lowest position, wheels locked, appropriate side rails in place/Call bell, personal items and telephone in reach/Instruct patient to call for assistance before getting out of bed/chair/stretcher/Non-slip footwear applied when patient is off stretcher/[place] to call system/Physically safe environment - no spills, clutter or unnecessary equipment/Purposeful proactive rounding/Room/bathroom lighting operational, light cord in reach

## 2024-01-20 NOTE — ED PROVIDER NOTE - CLINICAL SUMMARY MEDICAL DECISION MAKING FREE TEXT BOX
Patient with hx of CKD abnormal outpatient lab test asymptomatic.  Will re-check labs, EKG and Re-eval.    Normal potassium and sodium levels.  Elevated creatinine consistent with patient hx of CKD.  Follow-up encouraged

## 2024-01-20 NOTE — ED PROVIDER NOTE - NSFOLLOWUPINSTRUCTIONS_ED_ALL_ED_FT
1) Follow-up with your renal doctor (kidney doctor)  2) Follow up with your primary care doctor  3) Return to the ER for worsening or concerning symptoms

## 2024-01-20 NOTE — ED PROVIDER NOTE - OBJECTIVE STATEMENT
This patient is a 58 year old woman hx of Lupus and CKD stage 3 who presents to the ER after having an abnormal outpatient laboratory test.  She was seen by her renal doctor last week and had blood drawn.  She was called 3 days ago reporting that her sodium level was 159 and she needed to come to the hospital.  Patient states that she has been feeling well no recent illness, no fever, no palpitations, dizziness, leg swelling, urinary frequency and decrease urine output.

## 2024-01-20 NOTE — ED ADULT NURSE NOTE - EXTENSIONS OF SELF_ADULT
Palpitations: Care Instructions  Your Care Instructions     Heart palpitations are the uncomfortable sensation that your heart is beating fast or irregularly. You might feel pounding or fluttering in your chest. It might feel like your heart is skipping a beat. Although palpitations may be caused by a heart problem, they also occur because of stress, fatigue, or use of alcohol, caffeine, or nicotine. Many medicines, including diet pills, antihistamines, decongestants, and some herbal products, can cause heart palpitations. Nearly everyone has palpitations from time to time. Depending on your symptoms, your doctor may need to do more tests to try to find the cause of your palpitations. Follow-up care is a key part of your treatment and safety. Be sure to make and go to all appointments, and call your doctor if you are having problems. It's also a good idea to know your test results and keep a list of the medicines you take. How can you care for yourself at home? · Avoid caffeine, nicotine, and excess alcohol. · Do not take illegal drugs, such as methamphetamines and cocaine. · Do not take weight loss or diet medicines unless you talk with your doctor first.  · Get plenty of sleep. · Do not overeat. · If you have palpitations again, take deep breaths and try to relax. This may slow a racing heart. · If you start to feel lightheaded, lie down to avoid injuries that might result if you pass out and fall down. · Keep a record of your palpitations and bring it to your next doctor's appointment. Write down:  ? The date and time. ? Your pulse. (If your heart is beating fast, it may be hard to count your pulse.)  ? What you were doing when the palpitations started. ? How long the palpitations lasted. ? Any other symptoms. · If an activity causes palpitations, slow down or stop. Talk to your doctor before you do that activity again. · Take your medicines exactly as prescribed.  Call your doctor if you think you are having a problem with your medicine. When should you call for help? Call 911 anytime you think you may need emergency care. For example, call if:    · You passed out (lost consciousness).     · You have symptoms of a heart attack. These may include:  ? Chest pain or pressure, or a strange feeling in the chest.  ? Sweating. ? Shortness of breath. ? Pain, pressure, or a strange feeling in the back, neck, jaw, or upper belly or in one or both shoulders or arms. ? Lightheadedness or sudden weakness. ? A fast or irregular heartbeat. After you call 911, the  may tell you to chew 1 adult-strength or 2 to 4 low-dose aspirin. Wait for an ambulance. Do not try to drive yourself.     · You have symptoms of a stroke. These may include:  ? Sudden numbness, tingling, weakness, or loss of movement in your face, arm, or leg, especially on only one side of your body. ? Sudden vision changes. ? Sudden trouble speaking. ? Sudden confusion or trouble understanding simple statements. ? Sudden problems with walking or balance. ? A sudden, severe headache that is different from past headaches. Call your doctor now or seek immediate medical care if:    · You have heart palpitations and:  ? Are dizzy or lightheaded, or you feel like you may faint. ? Have new or increased shortness of breath. Watch closely for changes in your health, and be sure to contact your doctor if:    · You continue to have heart palpitations. Where can you learn more? Go to http://www.gray.com/  Enter R508 in the search box to learn more about \"Palpitations: Care Instructions. \"  Current as of: April 29, 2021               Content Version: 13.0  © 0464-3470 [employer]. Care instructions adapted under license by [employer] (which disclaims liability or warranty for this information).  If you have questions about a medical condition or this instruction, always ask your healthcare professional. [address] any warranty or liability for your use of this information. None

## 2024-01-20 NOTE — ED PROVIDER NOTE - PATIENT PORTAL LINK FT
You can access the FollowMyHealth Patient Portal offered by [facility] by registering at the following website: [email or web address]. By joining [employer]’s FollowMyHealth portal, you will also be able to view your health information using other applications (apps) compatible with our system.

## 2024-07-30 PROBLEM — Z00.00 ENCOUNTER FOR PREVENTIVE HEALTH EXAMINATION: Status: ACTIVE | Noted: 2024-07-30

## 2024-08-08 ENCOUNTER — APPOINTMENT (OUTPATIENT)
Dept: ORTHOPEDIC SURGERY | Facility: CLINIC | Age: 59
End: 2024-08-08

## 2024-08-08 ENCOUNTER — TRANSCRIPTION ENCOUNTER (OUTPATIENT)
Age: 59
End: 2024-08-08

## 2024-08-08 PROBLEM — M17.11 PRIMARY LOCALIZED OSTEOARTHRITIS OF RIGHT KNEE: Status: ACTIVE | Noted: 2024-08-08

## 2024-08-08 PROBLEM — M25.562 KNEE PAIN, LEFT: Status: ACTIVE | Noted: 2024-08-08

## 2024-08-08 PROBLEM — M17.0 PRIMARY LOCALIZED OSTEOARTHRITIS OF BOTH KNEES: Status: ACTIVE | Noted: 2024-08-08

## 2024-08-08 PROCEDURE — 20610 DRAIN/INJ JOINT/BURSA W/O US: CPT | Mod: RT

## 2024-08-08 PROCEDURE — 73562 X-RAY EXAM OF KNEE 3: CPT | Mod: RT

## 2024-08-08 PROCEDURE — 99204 OFFICE O/P NEW MOD 45 MIN: CPT | Mod: 25

## 2024-11-15 ENCOUNTER — APPOINTMENT (OUTPATIENT)
Dept: ORTHOPEDIC SURGERY | Facility: CLINIC | Age: 59
End: 2024-11-15

## 2025-08-11 ENCOUNTER — APPOINTMENT (OUTPATIENT)
Dept: TRANSPLANT | Facility: CLINIC | Age: 60
End: 2025-08-11

## 2025-08-11 ENCOUNTER — APPOINTMENT (OUTPATIENT)
Dept: NEPHROLOGY | Facility: CLINIC | Age: 60
End: 2025-08-11
Payer: COMMERCIAL

## 2025-08-11 VITALS
SYSTOLIC BLOOD PRESSURE: 144 MMHG | BODY MASS INDEX: 37.73 KG/M2 | DIASTOLIC BLOOD PRESSURE: 96 MMHG | TEMPERATURE: 98.7 F | HEART RATE: 64 BPM | WEIGHT: 205 LBS | HEIGHT: 62 IN | OXYGEN SATURATION: 97 %

## 2025-08-11 DIAGNOSIS — Z01.818 ENCOUNTER FOR OTHER PREPROCEDURAL EXAMINATION: ICD-10-CM

## 2025-08-11 LAB
ALBUMIN SERPL ELPH-MCNC: 4.2 G/DL
ALP BLD-CCNC: 71 U/L
ALT SERPL-CCNC: 13 U/L
ANION GAP SERPL CALC-SCNC: 15 MMOL/L
APPEARANCE: CLEAR
AST SERPL-CCNC: 19 U/L
BACTERIA: NEGATIVE /HPF
BILIRUB SERPL-MCNC: 0.7 MG/DL
BILIRUBIN URINE: NEGATIVE
BLOOD URINE: NEGATIVE
BUN SERPL-MCNC: 48 MG/DL
CALCIUM SERPL-MCNC: 9.3 MG/DL
CALCIUM SERPL-MCNC: 9.3 MG/DL
CAST: 0 /LPF
CHLORIDE SERPL-SCNC: 107 MMOL/L
CHOLEST SERPL-MCNC: 161 MG/DL
CO2 SERPL-SCNC: 22 MMOL/L
COLOR: YELLOW
CREAT SERPL-MCNC: 2.63 MG/DL
EGFRCR SERPLBLD CKD-EPI 2021: 21 ML/MIN/1.73M2
EPITHELIAL CELLS: 0 /HPF
GLUCOSE QUALITATIVE U: NEGATIVE MG/DL
GLUCOSE SERPL-MCNC: 66 MG/DL
HDLC SERPL-MCNC: 46 MG/DL
KETONES URINE: NEGATIVE MG/DL
LDLC SERPL-MCNC: 80 MG/DL
LEUKOCYTE ESTERASE URINE: NEGATIVE
MAGNESIUM SERPL-MCNC: 2.2 MG/DL
MICROSCOPIC-UA: NORMAL
NITRITE URINE: NEGATIVE
NONHDLC SERPL-MCNC: 115 MG/DL
PARATHYROID HORMONE INTACT: 166 PG/ML
PH URINE: 6.5
PHOSPHATE SERPL-MCNC: 3.8 MG/DL
POTASSIUM SERPL-SCNC: 4.7 MMOL/L
PROT SERPL-MCNC: 7.1 G/DL
PROTEIN URINE: 30 MG/DL
RED BLOOD CELLS URINE: 0 /HPF
SODIUM SERPL-SCNC: 143 MMOL/L
SPECIFIC GRAVITY URINE: 1.01
TRIGL SERPL-MCNC: 209 MG/DL
UROBILINOGEN URINE: 0.2 MG/DL
WHITE BLOOD CELLS URINE: 0 /HPF

## 2025-08-11 PROCEDURE — 99215 OFFICE O/P EST HI 40 MIN: CPT

## 2025-08-11 PROCEDURE — 99204 OFFICE O/P NEW MOD 45 MIN: CPT

## 2025-08-12 LAB
ABORH: NORMAL
ABORH: NORMAL
C PEPTIDE SERPL-MCNC: 13.1 NG/ML
CMV IGG SERPL QL: >10 U/ML
CMV IGG SERPL-IMP: POSITIVE
CREAT SPEC-SCNC: 59 MG/DL
CREAT/PROT UR: 0.7 RATIO
EBV DNA SERPL NAA+PROBE-ACNC: NOT DETECTED IU/ML
EBV EA AB SER IA-ACNC: 20.7 U/ML
EBV EA AB TITR SER IF: POSITIVE
EBV EA IGG SER QL IA: 95.7 U/ML
EBV EA IGG SER-ACNC: POSITIVE
EBV EA IGM SER IA-ACNC: NEGATIVE
EBV PATRN SPEC IB-IMP: NORMAL
EBV VCA IGG SER IA-ACNC: 194 U/ML
EBV VCA IGM SER QL IA: <10 U/ML
EBVPCR LOG: NOT DETECTED LOG10IU/ML
EPSTEIN-BARR VIRUS CAPSID ANTIGEN IGG: POSITIVE
HAV IGM SER QL: NONREACTIVE
HBV CORE IGG+IGM SER QL: NONREACTIVE
HBV SURFACE AB SER QL: NONREACTIVE
HBV SURFACE AB SERPL IA-ACNC: <3.3 MIU/ML
HBV SURFACE AG SER QL: NONREACTIVE
HCV AB SER QL: NONREACTIVE
HCV S/CO RATIO: 0.12 S/CO
HEPATITIS A IGG ANTIBODY: REACTIVE
HIV1+2 AB SPEC QL IA.RAPID: NONREACTIVE
HSV 1+2 IGG SER IA-IMP: POSITIVE
HSV 1+2 IGG SER IA-IMP: POSITIVE
HSV1 IGG SER QL: 35.2 INDEX
HSV2 IGG SER QL: 13.8 INDEX
MEV IGG FLD QL IA: >300 AU/ML
MEV IGG+IGM SER-IMP: POSITIVE
MUV AB SER-ACNC: POSITIVE
MUV IGG SER QL IA: 17.7 AU/ML
PROT UR-MCNC: 40 MG/DL
RUBV IGG FLD-ACNC: <0.1 INDEX
RUBV IGG FLD-ACNC: <0.1 INDEX
RUBV IGG SER-IMP: NEGATIVE
RUBV IGG SER-IMP: NEGATIVE
T GONDII AB SER-IMP: NEGATIVE
T GONDII IGG SER QL: <3 IU/ML
T PALLIDUM AB SER QL IA: NEGATIVE
VZV AB TITR SER: NEGATIVE
VZV AB TITR SER: NEGATIVE
VZV IGG SER IF-ACNC: 0.14 S/CO
VZV IGG SER IF-ACNC: 0.14 S/CO

## 2025-08-13 LAB
M TB IFN-G BLD-IMP: NEGATIVE
QUANTIFERON TB PLUS MITOGEN MINUS NIL: >10 IU/ML
QUANTIFERON TB PLUS NIL: 0.04 IU/ML
QUANTIFERON TB PLUS TB1 MINUS NIL: 0.02 IU/ML
QUANTIFERON TB PLUS TB2 MINUS NIL: 0.03 IU/ML

## 2025-08-18 LAB — STRONGYLOIDES AB SER IA-ACNC: NEGATIVE

## 2025-08-27 ENCOUNTER — APPOINTMENT (OUTPATIENT)
Dept: CARDIOLOGY | Facility: CLINIC | Age: 60
End: 2025-08-27

## 2025-09-12 ENCOUNTER — NON-APPOINTMENT (OUTPATIENT)
Age: 60
End: 2025-09-12

## 2025-09-15 ENCOUNTER — NON-APPOINTMENT (OUTPATIENT)
Age: 60
End: 2025-09-15